# Patient Record
(demographics unavailable — no encounter records)

---

## 2018-06-18 NOTE — MRI
MRI OF LUMBAR SPINE:

 

Date: 6-18-18

 

Comparison: None. 

 

History: Polyneuropathy, chronic pain. 

 

Technique: Multiplanar, multisequence MRI images were obtained of the lumbar spine without contrast. 


 

FINDINGS: 

The sagittal STIR imaging demonstrates no focal area of osseous marrow edema. There is fluid within t
he facet joints on the right at L2-3 and L3-4 and on the left at L2-3 through L5-S1. 

 

On the basis of five lumbar type vertebral bodies, the conus medullaris terminates in the T12-L1 cooper
on. 

 

T12-L1: There is disc space narrowing, disc desiccation and disc bulge with a left paracentral disc p
rotrusion. There is bilateral facet hypertrophy. There is mild left lateral recess stenosis. 

 

L1-2: Bilateral facet hypertrophy and hypertrophy of ligamentum flavum, right greater than left. Ther
e is disc space narrowing, disc desiccation and disc bulge. There is mild central canal stenosis with
 significant right lateral recess stenosis secondary to right foraminal and post foraminal disc protr
usion and facet hypertrophy. There is severe right neural foraminal stenosis. There is no significant
 left neural foraminal stenosis. 

 

L2-3: There is disc space narrowing, disc desiccation, and mild disc bulge. There is bilateral facet 
hypertrophy and hypertrophy of ligamentum flavum. There is mild central canal stenosis, moderate righ
t neural foraminal stenosis, and mild left neural foraminal stenosis. 

 

L3-4: There is disc space narrowing, disc desiccation and disc bulge. There is severe bilateral facet
 hypertrophy and hypertrophy of ligamentum flavum. There is severe central canal stenosis and severe 
left neural foraminal stenosis. There is moderate right neural foraminal stenosis. Severe left latera
l recess stenosis noted as well. 

 

L4-5: Disc space narrowing, disc desiccation, vacuum disc formation and disc bulge. Prominent facet h
ypertrophy and hypertrophy of ligamentum flavum, left greater than right. There is severe central can
al stenosis with complete obliteration of the thecal sac and mass effect on the nerve roots of the ca
uda equina. There is moderate/severe right neural foraminal stenosis and severe left neural foraminal
 stenosis. 

 

L5-S1: Disc space narrowing, disc desiccation and disc bulge present. Prominent bilateral facet hyper
trophy. Moderate/severe central canal stenosis. Severe bilateral neural foraminal stenosis. 

 

Imaged retroperitoneal structures demonstrate multiple T2 hyperintense lesions within the left kidney
 suggesting cysts. 

 

IMPRESSION: 

1. 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
tral canal and neural foraminal stenosis. 

 

POS: [facility]